# Patient Record
Sex: FEMALE | Race: BLACK OR AFRICAN AMERICAN | NOT HISPANIC OR LATINO | URBAN - METROPOLITAN AREA
[De-identification: names, ages, dates, MRNs, and addresses within clinical notes are randomized per-mention and may not be internally consistent; named-entity substitution may affect disease eponyms.]

---

## 2017-10-20 NOTE — PATIENT DISCUSSION
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 Mild. - Plan : Monitor for changes. Advised patient to call our office with decreased vision or increased symptoms. Safe for CL use ATs recommended OU 3-4x/daily and PRN for comfort. RV 1 Year for CL Check and Complete Exam, sooner with problems or changes in vision.

## 2018-06-25 ENCOUNTER — IMPORTED ENCOUNTER (OUTPATIENT)
Dept: URBAN - METROPOLITAN AREA CLINIC 9 | Facility: CLINIC | Age: 58
End: 2018-06-25

## 2018-06-25 PROBLEM — H04.223: Noted: 2018-06-25

## 2018-06-25 PROBLEM — H25.13: Noted: 2018-06-25

## 2018-06-25 PROBLEM — E11.9: Noted: 2018-06-25

## 2018-06-25 PROBLEM — H35.3111: Noted: 2018-06-25

## 2018-10-05 NOTE — PATIENT DISCUSSION
(H25.13) Age-related nuclear cataract, bilateral - Assesment : Examination revealed cataract OU. CL wearer. - Plan : Monitor for changes. Advised patient of condition of cataracts. Updated CLRx given. Pt to call our office with decreased vision or increased symptoms. RTC in 1 year for CL Exam, sooner if problems or changes.

## 2018-10-05 NOTE — PATIENT DISCUSSION
Monitor for changes. Advised patient of condition of cataracts. Updated CLRx given. Pt to call our office with decreased vision or increased symptoms.

## 2020-06-19 NOTE — PATIENT DISCUSSION
Monitor for changes. Advised patient of condition of cataracts and option of CE to improve visual function once becomes more bothered. Discussed symptoms of worsening and becoming more bothersome. Pt reports may like to consider CE next year. Pt to call our office with decreased vision or increased symptoms.

## 2021-04-19 NOTE — PATIENT DISCUSSION
Proceed with OD first. EYE OD, IOL TYPE Toric monofocal lens, POST OPERATIVE TARGET PL/-0.50, PACKAGE Custom.

## 2021-04-19 NOTE — PATIENT DISCUSSION
Consider OS to follow. EYE OS, IOL TYPE Toric monofocal lens, POST OPERATIVE TARGET -2.50, PACKAGE Custom vs Advanced, may consider Vivity with nearer target of -1.75.

## 2021-04-19 NOTE — PATIENT DISCUSSION
Discussed MF lens options. Multifocal IOLs have an added po risk of increased glare or halos and there is no guarantee that the pt will not need glasses po or see 20/20. Strongly recommend for patient to proceed with monovision because patient has been successful with it for so many years.

## 2021-04-19 NOTE — PATIENT DISCUSSION
Astigmatism correction was discussed. Patient understands if astigmatism is not corrected she will need to wear glasses for all distances.

## 2021-04-19 NOTE — PATIENT DISCUSSION
The cataracts are responsible for the patient's decrease in vision and may consider cataract surgery to improve quality and clarity of vision.

## 2021-10-16 ASSESSMENT — TONOMETRY
OD_IOP_MMHG: 16
OS_IOP_MMHG: 16

## 2021-10-16 ASSESSMENT — VISUAL ACUITY
OS_SC: 20/30 +2 SN
OS_CC: 20/20 SN
OD_CC: 20/20 SN
OD_SC: 20/25 SN

## 2021-12-06 ENCOUNTER — NEW PATIENT (OUTPATIENT)
Dept: URBAN - METROPOLITAN AREA CLINIC 17 | Facility: CLINIC | Age: 61
End: 2021-12-06

## 2021-12-06 DIAGNOSIS — H25.13: ICD-10-CM

## 2021-12-06 DIAGNOSIS — E11.9: ICD-10-CM

## 2021-12-06 DIAGNOSIS — H35.3111: ICD-10-CM

## 2021-12-06 PROCEDURE — 92134 CPTRZ OPH DX IMG PST SGM RTA: CPT

## 2021-12-06 PROCEDURE — 92015 DETERMINE REFRACTIVE STATE: CPT

## 2021-12-06 PROCEDURE — 92004 COMPRE OPH EXAM NEW PT 1/>: CPT

## 2021-12-06 ASSESSMENT — VISUAL ACUITY
OU_CC: 20/20
OS_CC: 20/20
OD_CC: 20/20

## 2021-12-06 ASSESSMENT — TONOMETRY
OS_IOP_MMHG: 14
OD_IOP_MMHG: 13

## 2023-01-16 ENCOUNTER — ESTABLISHED PATIENT (OUTPATIENT)
Dept: URBAN - METROPOLITAN AREA CLINIC 17 | Facility: CLINIC | Age: 63
End: 2023-01-16

## 2023-01-16 DIAGNOSIS — H35.3111: ICD-10-CM

## 2023-01-16 DIAGNOSIS — H25.13: ICD-10-CM

## 2023-01-16 DIAGNOSIS — E11.9: ICD-10-CM

## 2023-01-16 PROCEDURE — 92134 CPTRZ OPH DX IMG PST SGM RTA: CPT

## 2023-01-16 PROCEDURE — 92014 COMPRE OPH EXAM EST PT 1/>: CPT

## 2023-01-16 PROCEDURE — 92015 DETERMINE REFRACTIVE STATE: CPT

## 2023-01-16 ASSESSMENT — VISUAL ACUITY
OD_GLARE: 20/400
OS_CC: 20/30-1
OD_CC: 20/25
OS_GLARE: 20/200

## 2023-01-16 ASSESSMENT — TONOMETRY
OD_IOP_MMHG: 14
OS_IOP_MMHG: 18